# Patient Record
Sex: FEMALE | Race: WHITE | URBAN - METROPOLITAN AREA
[De-identification: names, ages, dates, MRNs, and addresses within clinical notes are randomized per-mention and may not be internally consistent; named-entity substitution may affect disease eponyms.]

---

## 2020-03-03 ENCOUNTER — HOSPITAL ENCOUNTER (OUTPATIENT)
Age: 25
Setting detail: SPECIMEN
Discharge: HOME | End: 2020-03-03
Payer: COMMERCIAL

## 2020-03-03 LAB
ABSOLUTE EOS #: 0.15 K/UL (ref 0–0.44)
ABSOLUTE IMMATURE GRANULOCYTE: <0.03 K/UL (ref 0–0.3)
ABSOLUTE LYMPH #: 1.61 K/UL (ref 1.1–3.7)
ABSOLUTE MONO #: 0.29 K/UL (ref 0.1–1.2)
BASOPHILS # BLD: 1 % (ref 0–2)
BASOPHILS ABSOLUTE: 0.03 K/UL (ref 0–0.2)
DIFFERENTIAL TYPE: NORMAL
EOSINOPHILS RELATIVE PERCENT: 4 % (ref 1–4)
HBV SURFACE AB TITR SER: 5.22 MIU/ML
HCT VFR BLD CALC: 44 % (ref 36.3–47.1)
HEMOGLOBIN: 14.5 G/DL (ref 11.9–15.1)
IMMATURE GRANULOCYTES: 0 %
LYMPHOCYTES # BLD: 42 % (ref 24–43)
MCH RBC QN AUTO: 31 PG (ref 25.2–33.5)
MCHC RBC AUTO-ENTMCNC: 33 G/DL (ref 28.4–34.8)
MCV RBC AUTO: 94 FL (ref 82.6–102.9)
MONOCYTES # BLD: 8 % (ref 3–12)
NRBC AUTOMATED: 0 PER 100 WBC
PDW BLD-RTO: 12.9 % (ref 11.8–14.4)
PLATELET # BLD: 271 K/UL (ref 138–453)
PLATELET ESTIMATE: NORMAL
PMV BLD AUTO: 11.9 FL (ref 8.1–13.5)
RBC # BLD: 4.68 M/UL (ref 3.95–5.11)
RBC # BLD: NORMAL 10*6/UL
RUBV IGG SER QL: 97.2 IU/ML
SEG NEUTROPHILS: 45 % (ref 36–65)
SEGMENTED NEUTROPHILS ABSOLUTE COUNT: 1.79 K/UL (ref 1.5–8.1)
WBC # BLD: 3.9 K/UL (ref 3.5–11.3)
WBC # BLD: NORMAL 10*3/UL

## 2020-03-04 LAB
MEASLES IMMUNE (IGG): 3.83
MUV IGG SER QL: 2.93
VZV IGG SER QL IA: 2.26

## 2024-08-24 ENCOUNTER — HOSPITAL ENCOUNTER (EMERGENCY)
Age: 29
Discharge: HOME | End: 2024-08-24
Attending: EMERGENCY MEDICINE
Payer: COMMERCIAL

## 2024-08-24 ENCOUNTER — APPOINTMENT (OUTPATIENT)
Dept: CT IMAGING | Age: 29
End: 2024-08-24
Payer: COMMERCIAL

## 2024-08-24 VITALS
OXYGEN SATURATION: 100 % | SYSTOLIC BLOOD PRESSURE: 148 MMHG | RESPIRATION RATE: 14 BRPM | DIASTOLIC BLOOD PRESSURE: 76 MMHG | WEIGHT: 175 LBS | BODY MASS INDEX: 29.16 KG/M2 | HEIGHT: 65 IN | TEMPERATURE: 98.3 F | HEART RATE: 100 BPM

## 2024-08-24 DIAGNOSIS — J03.90 ACUTE TONSILLITIS, UNSPECIFIED ETIOLOGY: Primary | ICD-10-CM

## 2024-08-24 LAB
B-HCG SERPL EIA 3RD IS-ACNC: <1 MIU/ML
BASOPHILS # BLD: 0.08 K/UL (ref 0–0.2)
BASOPHILS NFR BLD: 1 % (ref 0–2)
EOSINOPHIL # BLD: 0.08 K/UL (ref 0–0.4)
EOSINOPHILS RELATIVE PERCENT: 1 % (ref 1–4)
ERYTHROCYTE [DISTWIDTH] IN BLOOD BY AUTOMATED COUNT: 13.3 % (ref 12.5–15.4)
HCT VFR BLD AUTO: 40.6 % (ref 36–46)
HETEROPH AB BLD QL IA: NEGATIVE
HGB BLD-MCNC: 14 G/DL (ref 12–16)
LYMPHOCYTES NFR BLD: 3.43 K/UL (ref 1–4.8)
LYMPHOCYTES RELATIVE PERCENT: 44 % (ref 24–44)
MCH RBC QN AUTO: 31 PG (ref 26–34)
MCHC RBC AUTO-ENTMCNC: 34.3 G/DL (ref 31–37)
MCV RBC AUTO: 90.2 FL (ref 80–100)
MONOCYTES NFR BLD: 0.62 K/UL (ref 0.1–1.2)
MONOCYTES NFR BLD: 8 % (ref 2–11)
MORPHOLOGY: NORMAL
NEUTROPHILS NFR BLD: 46 % (ref 36–66)
NEUTS SEG NFR BLD: 3.59 K/UL (ref 1.8–7.7)
PLATELET # BLD AUTO: 297 K/UL (ref 140–450)
PMV BLD AUTO: 8.1 FL (ref 6–12)
RBC # BLD AUTO: 4.5 M/UL (ref 4–5.2)
WBC OTHER # BLD: 7.8 K/UL (ref 3.5–11)

## 2024-08-24 PROCEDURE — 86308 HETEROPHILE ANTIBODY SCREEN: CPT

## 2024-08-24 PROCEDURE — 99285 EMERGENCY DEPT VISIT HI MDM: CPT

## 2024-08-24 PROCEDURE — 85025 COMPLETE CBC W/AUTO DIFF WBC: CPT

## 2024-08-24 PROCEDURE — 6360000004 HC RX CONTRAST MEDICATION: Performed by: EMERGENCY MEDICINE

## 2024-08-24 PROCEDURE — 2580000003 HC RX 258: Performed by: EMERGENCY MEDICINE

## 2024-08-24 PROCEDURE — 70491 CT SOFT TISSUE NECK W/DYE: CPT

## 2024-08-24 PROCEDURE — 36415 COLL VENOUS BLD VENIPUNCTURE: CPT

## 2024-08-24 PROCEDURE — 84702 CHORIONIC GONADOTROPIN TEST: CPT

## 2024-08-24 RX ORDER — SODIUM CHLORIDE 0.9 % (FLUSH) 0.9 %
10 SYRINGE (ML) INJECTION PRN
Status: DISCONTINUED | OUTPATIENT
Start: 2024-08-24 | End: 2024-08-24 | Stop reason: HOSPADM

## 2024-08-24 RX ORDER — IOPAMIDOL 755 MG/ML
75 INJECTION, SOLUTION INTRAVASCULAR
Status: COMPLETED | OUTPATIENT
Start: 2024-08-24 | End: 2024-08-24

## 2024-08-24 RX ORDER — 0.9 % SODIUM CHLORIDE 0.9 %
80 INTRAVENOUS SOLUTION INTRAVENOUS ONCE
Status: DISCONTINUED | OUTPATIENT
Start: 2024-08-24 | End: 2024-08-24 | Stop reason: HOSPADM

## 2024-08-24 RX ADMIN — Medication 80 ML: at 17:32

## 2024-08-24 RX ADMIN — SODIUM CHLORIDE, PRESERVATIVE FREE 10 ML: 5 INJECTION INTRAVENOUS at 17:32

## 2024-08-24 RX ADMIN — IOPAMIDOL 75 ML: 755 INJECTION, SOLUTION INTRAVENOUS at 17:32

## 2024-08-24 ASSESSMENT — PAIN - FUNCTIONAL ASSESSMENT: PAIN_FUNCTIONAL_ASSESSMENT: 0-10

## 2024-08-24 ASSESSMENT — ENCOUNTER SYMPTOMS
NAUSEA: 0
SORE THROAT: 1
SHORTNESS OF BREATH: 0
VOMITING: 0
WHEEZING: 0
SINUS PAIN: 0
COUGH: 0
TROUBLE SWALLOWING: 1

## 2024-08-24 ASSESSMENT — PAIN DESCRIPTION - LOCATION: LOCATION: THROAT

## 2024-08-24 ASSESSMENT — PAIN DESCRIPTION - ORIENTATION: ORIENTATION: RIGHT

## 2024-08-24 ASSESSMENT — PAIN SCALES - GENERAL: PAINLEVEL_OUTOF10: 4

## 2024-08-24 NOTE — ED PROVIDER NOTES
[facility] Emergency Department  [address]  [facility] [address]  Phone: [phone]  Fax: [phone]    EMERGENCY DEPARTMENT ENCOUNTER          Pt Name: Radha Sutton  MRN: [id number]  Birthdate 1995  Date of evaluation: 8/24/2024      CHIEF COMPLAINT       Chief Complaint   Patient presents with    Pharyngitis     Pt arrives with co right sided throat pain. Pt states she went to urgent care pta and was swabbed neg for strep. Pt states she found it on Tuesday. Pt states she has not have low grade fever.        HISTORY OF PRESENT ILLNESS       Radha Sutton is a 29 y.o. female who presents with sore throat and trouble swallowing since around Tuesday.  Patient initially went to urgent care where they tested her for strep throat which was found to be negative, and sent her here for further evaluation.  Patient states that the white spot over her right tonsil has been there since Tuesday.  Denies shortness of breath, fevers, malaise, nausea, abdominal pain.    REVIEW OF SYSTEMS       Review of Systems   Constitutional:  Negative for fatigue and fever.   HENT:  Positive for sore throat and trouble swallowing. Negative for sinus pain.    Respiratory:  Negative for cough, shortness of breath and wheezing.    Cardiovascular:  Negative for chest pain.   Gastrointestinal:  Negative for nausea and vomiting.   Neurological:  Negative for syncope, facial asymmetry, weakness, light-headedness and headaches.        PAST MEDICAL HISTORY    has no past medical history on file.    SURGICAL HISTORY      has no past surgical history on file.    CURRENT MEDICATIONS       Previous Medications    No medications on file       ALLERGIES     is allergic to latex, cefixime, and loracarbef.    FAMILY HISTORY     has no family status information on file.      family history is not on file.    SOCIAL HISTORY      reports that she has never smoked. She does not have any smokeless tobacco history on  20 tablet     Refill:  0        Vitals:    Vitals:    08/24/24 1554   BP: (!) 148/76   Pulse: 100   Resp: 14   Temp: 98.3 °F (36.8 °C)   TempSrc: Oral   SpO2: 100%   Weight: 79.4 kg (175 lb)   Height: 1.651 m (5' 5\")     -------------------------  BP: (!) 148/76, Temp: 98.3 °F (36.8 °C), Pulse: 100, Respirations: 14      Re-evaluation Notes    Remains with no exacerbation of symptoms.  Patient was told that CT showed no evidence of abscess and is okay following with primary care physician and living with antibiotics in the condition.    CONSULTS:    None    CRITICAL CARE:     None    PROCEDURES:    None    FINAL IMPRESSION      1. Acute tonsillitis, unspecified etiology        29-year-old female coming in with sore throat since Tuesday.  At that time patient noticed a white spot on her right tonsil that had initially started to improve before getting worse.  CT soft tissue neck was done showing no evidence of abscess and tonsillitis.  White count is not elevated and patient was found to be mono negative on testing.  Patient was sent home in good condition with 10 days of Augmentin 875 twice daily and told to follow-up with a primary care physician.  DISPOSITION/PLAN   DISPOSITION Decision To Discharge 08/24/2024 06:50:32 PM  Condition at Disposition: Stable      Condition on Disposition    Improved    PATIENT REFERRED TO:  Kimberley Bray, APRN - CNP  [address] [address]  [phone]    Schedule an appointment as soon as possible for a visit         DISCHARGE MEDICATIONS:  New Prescriptions    AMOXICILLIN-CLAVULANATE (AUGMENTIN) 875-125 MG PER TABLET    Take 1 tablet by mouth 2 times daily for 10 days       (Please note that portions of this note were completed with a voice recognition program.  Efforts were made to edit the dictations but occasionally words are mis-transcribed.)    DO Denia Kenny PGY2

## 2024-08-24 NOTE — ED PROVIDER NOTES
[facility] Emergency Department      Pt Name: Radha Sutton  MRN: [id number]  Birthdate 1995  Date of evaluation: 8/24/2024    EMERGENCY DEPARTMENT ENCOUNTER      PERTINENT ATTENDING PHYSICIAN COMMENTS:      Attestation  I performed a history and physical examination of the patient/ or directly observed  and discussed management with the resident. I reviewed the resident’s note and agree with the documented findings and plan of care. Any areas of disagreement are noted on the chart. I was personally present for the key portions of any procedures. I have documented in the chart those procedures where I was not present during the key portions. I have reviewed the emergency nurses triage note. I agree with the chief complaint, past medical history, past surgical history, allergies, medications, social and family history as documented unless otherwise noted below.    For Residents/Physician Assistant/ Nurse Practitioner cases/documentation I have personally evaluated this patient and have completed at least one if not all key elements of the E/M (history, physical exam, and MDM). Additional findings are as noted.    CHIEF COMPLAINT       Chief Complaint   Patient presents with    Pharyngitis     Pt arrives with co right sided throat pain. Pt states she went to urgent care pta and was swabbed neg for strep. Pt states she found it on Tuesday. Pt states she has not have low grade fever.        HISTORY OF PRESENT ILLNESS    Radha Sutton is a 29 y.o. female who presents to the emergency department with sore throat St from urgent care.  Symptoms started on Tuesday right tonsillar swelling.  Denies fevers or chills no cough or congestion.  She went to urgent care today there was concern for abscess.  She denies any other relevant symptoms.  Pain 4 out of 10.    PAST MEDICAL HISTORY    has no past medical history on file.    SURGICAL HISTORY      has no past surgical history on file.    CURRENT MEDICATIONS

## 2024-08-24 NOTE — DISCHARGE INSTRUCTIONS
SUMMARY OF YOUR VISIT    Today you were seen for sore throat and purulents of your right tonsil. You were strep negative at urgent care, and mono negative here. Your imaging showed tonsillitis without abscess. We are sending you home with antibiotics, and you are to follow up with your primary care admission.     Please continue to take your home medication as previously prescribed, I have made no changes to your home medications.        You can return to our or another Emergency Department as needed or for worsening symptoms of chest pain, shortness of breath, high fevers not relieved by acetaminophen (Tylenol) and/or ibuprofen (Motrin / Advil), chills, feeling of your heart fluttering or racing, persistent nausea and/or vomiting, vomiting up blood, blood in your stool, loss of consciousness, numbness, weakness or tingling in the arms or legs or change in color of the extremities, changes in mental status, persistent headache, blurry vision, loss of bladder / bowel control, if you are unable to follow up with your physician, or other any other care or concern.    Thank You!    On behalf of the Emergency Department staff and team, I would like to thank you for allowing us the opportunity to participate in your health care and evaluation today.